# Patient Record
(demographics unavailable — no encounter records)

---

## 2024-10-08 NOTE — ASSESSMENT
[FreeTextEntry1] : 1) Nevi - Counseled about clinically benign lesions - Discussed regular OTC sunscreen use, SPF 30 or higher   2) Seborrheic dermatitis, chronic/stable - Reviewed risks (as well as mitigation strategies for adverse drug events as applicable), benefits, and alternatives of therapy  - Cont ketoconazole shampoo  3) Skin cancer Screening - No lesions clinically concerning for malignancy today - Discussed regular self skin checks and ABCDEs of skin cancer screening - Discussed regular sunscreen use - Pt instructed to report any new or changing lesions  RTC 1 yr for FBSE or sooner if any concerns

## 2024-10-08 NOTE — PHYSICAL EXAM
[Full Body Skin Exam Performed] : performed [FreeTextEntry3] : minimal scale in scalp Fairly uniform and regular brown macules and papules on the trunk and extremities

## 2024-10-08 NOTE — HISTORY OF PRESENT ILLNESS
[FreeTextEntry1] : moles [de-identified] : # Mole/skin check Concerns today: none Sunscreen use: when exposed to sun  Hx of blistering sun burns: none   Personal history of skin cancer: none  Family history of skin cancer: mother; hx of skin cancer  Last saw Dr. Saldana in the past 1-2 years.

## 2024-10-25 NOTE — HISTORY OF PRESENT ILLNESS
[de-identified] : left knee has been having occsaioanl sharp pain no trauma  started after pushing w pt hip has been improving no numbness or tingling no f/c/ns

## 2024-10-25 NOTE — DISCUSSION/SUMMARY
[de-identified] : discussed likely coming from hip discussed precautions and he will let me know if it gets worse

## 2024-10-25 NOTE — PHYSICAL EXAM
[de-identified] : left knee w full painless rom no effusion full strength no motor or sensory deficits pain free slr normal gait [de-identified] : ap/pa/lat/sunrise left knee show preserved joint spaces